# Patient Record
Sex: FEMALE | Race: WHITE | NOT HISPANIC OR LATINO | Employment: UNEMPLOYED | URBAN - METROPOLITAN AREA
[De-identification: names, ages, dates, MRNs, and addresses within clinical notes are randomized per-mention and may not be internally consistent; named-entity substitution may affect disease eponyms.]

---

## 2022-11-30 ENCOUNTER — TRANSFERRED RECORDS (OUTPATIENT)
Dept: HEALTH INFORMATION MANAGEMENT | Facility: CLINIC | Age: 37
End: 2022-11-30

## 2023-02-13 ENCOUNTER — TRANSFERRED RECORDS (OUTPATIENT)
Dept: HEALTH INFORMATION MANAGEMENT | Facility: CLINIC | Age: 38
End: 2023-02-13

## 2023-02-18 ENCOUNTER — TRANSFERRED RECORDS (OUTPATIENT)
Dept: HEALTH INFORMATION MANAGEMENT | Facility: CLINIC | Age: 38
End: 2023-02-18

## 2023-02-18 LAB
ALT SERPL-CCNC: 28 U/L (ref 9–41)
AST SERPL-CCNC: 17 U/L (ref 12–38)
CREATININE (EXTERNAL): 0.8 MG/DL (ref 0.7–1.4)
GFR ESTIMATED (EXTERNAL): >60 ML/MIN/1.73M2
GLUCOSE (EXTERNAL): 442 MG/DL (ref 64–112)
POTASSIUM (EXTERNAL): 4.2 MMOL/L (ref 3.5–5.3)

## 2023-03-07 ENCOUNTER — MEDICAL CORRESPONDENCE (OUTPATIENT)
Dept: HEALTH INFORMATION MANAGEMENT | Facility: HOSPITAL | Age: 38
End: 2023-03-07

## 2023-03-17 ENCOUNTER — TRANSFERRED RECORDS (OUTPATIENT)
Dept: HEALTH INFORMATION MANAGEMENT | Facility: CLINIC | Age: 38
End: 2023-03-17

## 2023-03-17 LAB
ALT SERPL-CCNC: 38 U/L (ref 9–41)
AST SERPL-CCNC: 25 U/L (ref 12–38)
CREATININE (EXTERNAL): 0.9 MG/DL (ref 0.7–1.4)
GFR ESTIMATED (EXTERNAL): >60 ML/MIN/1.73M2
GLUCOSE (EXTERNAL): 560 MG/DL (ref 64–112)
HBA1C MFR BLD: 11.2 % (ref 4.7–6.7)
POTASSIUM (EXTERNAL): 4.1 MMOL/L (ref 3.5–5.3)

## 2023-03-29 ENCOUNTER — OFFICE VISIT (OUTPATIENT)
Dept: CARDIOLOGY | Facility: OTHER | Age: 38
End: 2023-03-29
Attending: NURSE PRACTITIONER
Payer: COMMERCIAL

## 2023-03-29 VITALS
DIASTOLIC BLOOD PRESSURE: 85 MMHG | OXYGEN SATURATION: 98 % | HEART RATE: 104 BPM | RESPIRATION RATE: 20 BRPM | WEIGHT: 255.8 LBS | HEIGHT: 67 IN | BODY MASS INDEX: 40.15 KG/M2 | SYSTOLIC BLOOD PRESSURE: 160 MMHG

## 2023-03-29 DIAGNOSIS — I10 ESSENTIAL HYPERTENSION: ICD-10-CM

## 2023-03-29 DIAGNOSIS — E78.5 HYPERLIPIDEMIA LDL GOAL <100: ICD-10-CM

## 2023-03-29 DIAGNOSIS — E11.65 TYPE 2 DIABETES MELLITUS WITH HYPERGLYCEMIA, WITH LONG-TERM CURRENT USE OF INSULIN (H): ICD-10-CM

## 2023-03-29 DIAGNOSIS — Z79.4 TYPE 2 DIABETES MELLITUS WITH HYPERGLYCEMIA, WITH LONG-TERM CURRENT USE OF INSULIN (H): ICD-10-CM

## 2023-03-29 DIAGNOSIS — R60.0 BILATERAL LOWER EXTREMITY EDEMA: ICD-10-CM

## 2023-03-29 DIAGNOSIS — R07.9 EXERTIONAL CHEST PAIN: ICD-10-CM

## 2023-03-29 DIAGNOSIS — I47.10 SVT (SUPRAVENTRICULAR TACHYCARDIA) (H): Primary | ICD-10-CM

## 2023-03-29 DIAGNOSIS — F17.200 TOBACCO USE DISORDER: ICD-10-CM

## 2023-03-29 DIAGNOSIS — R06.09 DYSPNEA ON EXERTION: ICD-10-CM

## 2023-03-29 DIAGNOSIS — E66.01 CLASS 3 SEVERE OBESITY DUE TO EXCESS CALORIES WITHOUT SERIOUS COMORBIDITY WITH BODY MASS INDEX (BMI) OF 40.0 TO 44.9 IN ADULT (H): ICD-10-CM

## 2023-03-29 DIAGNOSIS — Z82.49 FAMILY HISTORY OF EARLY CAD: ICD-10-CM

## 2023-03-29 DIAGNOSIS — E66.813 CLASS 3 SEVERE OBESITY DUE TO EXCESS CALORIES WITHOUT SERIOUS COMORBIDITY WITH BODY MASS INDEX (BMI) OF 40.0 TO 44.9 IN ADULT (H): ICD-10-CM

## 2023-03-29 DIAGNOSIS — R00.0 SINUS TACHYCARDIA: ICD-10-CM

## 2023-03-29 PROBLEM — D72.829 LEUKOCYTOSIS: Status: ACTIVE | Noted: 2021-02-25

## 2023-03-29 PROBLEM — K21.9 GASTROESOPHAGEAL REFLUX DISEASE WITHOUT ESOPHAGITIS: Status: ACTIVE | Noted: 2021-02-25

## 2023-03-29 PROBLEM — K80.20 CALCULUS OF GALLBLADDER WITHOUT CHOLECYSTITIS WITHOUT OBSTRUCTION: Status: ACTIVE | Noted: 2022-06-29

## 2023-03-29 PROBLEM — E11.9 DIABETES TYPE 2, CONTROLLED (H): Status: ACTIVE | Noted: 2021-09-15

## 2023-03-29 PROBLEM — F31.9 BIPOLAR 1 DISORDER (H): Status: ACTIVE | Noted: 2021-02-25

## 2023-03-29 PROBLEM — F41.0 PANIC ANXIETY SYNDROME: Status: ACTIVE | Noted: 2021-01-04

## 2023-03-29 PROCEDURE — 93010 ELECTROCARDIOGRAM REPORT: CPT | Mod: 77 | Performed by: INTERNAL MEDICINE

## 2023-03-29 PROCEDURE — 99205 OFFICE O/P NEW HI 60 MIN: CPT | Performed by: NURSE PRACTITIONER

## 2023-03-29 PROCEDURE — G0463 HOSPITAL OUTPT CLINIC VISIT: HCPCS

## 2023-03-29 PROCEDURE — 93005 ELECTROCARDIOGRAM TRACING: CPT | Performed by: NURSE PRACTITIONER

## 2023-03-29 RX ORDER — CLONAZEPAM 0.5 MG/1
TABLET ORAL
COMMUNITY
Start: 2023-03-13 | End: 2023-05-18

## 2023-03-29 RX ORDER — EXENATIDE 2 MG/.85ML
INJECTION, SUSPENSION, EXTENDED RELEASE SUBCUTANEOUS
COMMUNITY
Start: 2023-03-23 | End: 2023-05-18

## 2023-03-29 RX ORDER — METFORMIN HCL 500 MG
TABLET, EXTENDED RELEASE 24 HR ORAL
COMMUNITY
Start: 2022-11-17

## 2023-03-29 RX ORDER — OLANZAPINE 10 MG/1
10 TABLET ORAL AT BEDTIME
COMMUNITY
Start: 2023-03-27 | End: 2023-05-18

## 2023-03-29 RX ORDER — ATORVASTATIN CALCIUM 20 MG/1
20 TABLET, FILM COATED ORAL AT BEDTIME
COMMUNITY
Start: 2023-03-07

## 2023-03-29 RX ORDER — SUMATRIPTAN 50 MG/1
TABLET, FILM COATED ORAL
COMMUNITY
Start: 2022-07-19 | End: 2023-09-26

## 2023-03-29 RX ORDER — ASPIRIN 81 MG/1
TABLET, CHEWABLE ORAL
COMMUNITY
Start: 2023-03-07 | End: 2023-05-18

## 2023-03-29 RX ORDER — BUSPIRONE HYDROCHLORIDE 5 MG/1
TABLET ORAL
COMMUNITY
Start: 2023-01-18 | End: 2023-03-29

## 2023-03-29 RX ORDER — METOPROLOL SUCCINATE 50 MG/1
50 TABLET, EXTENDED RELEASE ORAL EVERY EVENING
Qty: 90 TABLET | Refills: 1 | Status: SHIPPED | OUTPATIENT
Start: 2023-03-29

## 2023-03-29 RX ORDER — MEDROXYPROGESTERONE ACETATE 150 MG/ML
150 INJECTION, SUSPENSION INTRAMUSCULAR
COMMUNITY
Start: 2021-08-10 | End: 2023-09-26

## 2023-03-29 RX ORDER — FUROSEMIDE 20 MG
20 TABLET ORAL DAILY
Qty: 90 TABLET | Refills: 1 | Status: SHIPPED | OUTPATIENT
Start: 2023-03-29 | End: 2023-09-26

## 2023-03-29 RX ORDER — ALBUTEROL SULFATE 90 UG/1
AEROSOL, METERED RESPIRATORY (INHALATION)
COMMUNITY
Start: 2022-11-17

## 2023-03-29 RX ORDER — INSULIN GLARGINE 100 [IU]/ML
INJECTION, SOLUTION SUBCUTANEOUS
COMMUNITY
Start: 2023-03-23

## 2023-03-29 RX ORDER — LURASIDONE HYDROCHLORIDE 40 MG/1
TABLET, FILM COATED ORAL
COMMUNITY
Start: 2023-01-23 | End: 2023-03-29

## 2023-03-29 RX ORDER — LISINOPRIL 5 MG/1
1 TABLET ORAL
COMMUNITY
Start: 2023-03-07 | End: 2023-03-29

## 2023-03-29 RX ORDER — LISINOPRIL 10 MG/1
10 TABLET ORAL
Qty: 90 TABLET | Refills: 1 | Status: SHIPPED | OUTPATIENT
Start: 2023-03-29

## 2023-03-29 RX ORDER — NYSTATIN 100000 [USP'U]/G
POWDER TOPICAL
COMMUNITY
Start: 2022-11-22 | End: 2023-09-26

## 2023-03-29 ASSESSMENT — PAIN SCALES - GENERAL: PAINLEVEL: MODERATE PAIN (5)

## 2023-03-29 NOTE — PROGRESS NOTES
"[facility] HEART CARE   CARDIOLOGY CONSULT     Esther Morales   [address]    [facility] [address]      No primary care provider on file.     Chief Complaint   Patient presents with     Consult     SVT        HPI:   Esther Morales is a pleasant 37-year old female who presents for cardiology consult regarding abnormal leadless EKG monitor. She has a cardiac history including irregular heart beat, SVT, sinus tachycardia, hypertension, hyperlipidemia. She has a non-cardiac history including type II diabetes mellitus, obesity, tobacco use.       Today, Ms. Morales tells me she has a longstanding history of irregular heart beat. She describes this as a pounding sensation and sometimes does make her chest hurt. She tells me that she feels this sensation all day long. When she lies down symptoms improve. She tells me when outside or in the heat she feels near-syncopal. She has had a syncopal episode a few years ago- she tells me she was just standing in a room where it was hot outside and she passed out onto a bed. She was not evaluated as she attributed this to the heat. She also tells me that she feels dizzy and has episodes about twice daily where head starts spinning, vision changes- goes blank. This lasts maybe a minute or two at most. Symptoms usually start after she has been standing for a while. She sits down and rests and symptoms improve.    She is limited to going up or down stairs due to arthritis in her knees. She tells me she could probably walk about 1/2 block and then would need to stop and rest due to chest pain and breathing. Chest discomfort feels like \"a pounding, sometimes a sharp pain.\" Symptoms do improve with rest. She also tells me she has had two week history of bilateral lower extremity edema. Sitting with feet in a dependent position is uncomfortable due to swelling. She does try to keep them elevated but this does not seem to make much difference in swelling or discomfort.         Smoking History: " She smokes medical cannabis for anxiety/depression. Started smoking cigarettes at age 17, continues to smoke daily- 1/2 ppd. At her peak she smokes up to 1 ppd.      Alcohol History: None      Substance Abuse History: None      Sleep History: She does snore. She does have a history of witnessed apnea from  who will rouse her awake. Wakes up zero out of 7 mornings feeling rested. She does feel heart pounding at night and it will wake her up. She does have daytime fatigue. She does take midday nap around 12/1pm. She is scheduled to have a sleep evaluation at [facility] May 15.       Family History: Father- MI s/p stents (first was at age 52), passed away this year at age 65 from MI, she tells me her father also had an ablation on his heart but uncertain exactly what this ablation was for. Mother- 11 open heart surgeries (first MI at age 35 years old), diabetes s/p double amputation, She does have 9 siblings with same father- no cardiac disease  She does have 4 siblings with same mother- oldest brother had MI two years ago at age 37 with CABG.           RELEVANT TESTING:  Leadless EKG Monitor 2/2023  Predominant underlying sinus rhythm  Minimum heart rate 46 bpm, maximum  bpm, average heart rate of 106 bpm    x1 run of SVT lasting 5 beats with a max rate of 174 bpm  X 12 triggered events correlated with sinus rhythm HR . Activity not reported with triggered events      PAST MEDICAL HISTORY:   History reviewed. No pertinent past medical history.       FAMILY HISTORY:   History reviewed. No pertinent family history.       PAST SURGICAL HISTORY:   History reviewed. No pertinent surgical history.       SOCIAL HISTORY:           CURRENT MEDICATIONS:   Current Outpatient Medications   Medication     atorvastatin (LIPITOR) 20 MG tablet     BYDUREON BCISE 2 MG/0.85ML auto-injector     clonazePAM (KLONOPIN) 0.5 MG tablet     furosemide (LASIX) 20 MG tablet     GOODSENSE ASPIRIN 81 MG chewable  "tablet     LANTUS SOLOSTAR 100 UNIT/ML soln     lisinopril (ZESTRIL) 10 MG tablet     medroxyPROGESTERone (DEPO-PROVERA) 150 MG/ML IM injection     metFORMIN (GLUCOPHAGE XR) 500 MG 24 hr tablet     metoprolol succinate ER (TOPROL XL) 50 MG 24 hr tablet     nystatin (MYCOSTATIN) 416287 UNIT/GM external powder     OLANZapine (ZYPREXA) 10 MG tablet     omeprazole (PRILOSEC) 20 MG DR capsule     SUMAtriptan (IMITREX) 50 MG tablet     VENTOLIN  (90 Base) MCG/ACT inhaler     No current facility-administered medications for this visit.            ALLERGIES:   Allergies   Allergen Reactions     Cyclobenzaprine Dizziness     Panic, dizziness          ROS:   CONSTITUTIONAL: No reported fever or chills. No changes in weight.  ENT: No visual disturbance, ear ache, epistaxis or sore throat.   CARDIOVASCULAR: +Chest discomfort, palpitations/racing, bilateral lower extremities (See HPI).   RESPIRATORY: +dyspnea, dyspnea on exertion (See HPI). No cough, wheezing or hemoptysis.   GI: No abdominal pain. No nausea, vomiting or diarrhea. No melena or hematochezia. No changes in bowel habits.   : No hematuria or dysuria. No hesitancy or incontinence.   NEUROLOGICAL: +lightheadedness, dizziness, near-syncope (See HPI).  No headache, ataxia, paresthesias or weakness.   HEMATOLOGIC: No bleeding or excessive bruising. No history of blood clots.   MUSCULOSKELETAL: No joint pain or swelling, no muscle pain.  ENDOCRINOLOGIC: No temperature intolerance. No hair or skin changes.  SKIN: No abnormal rashes or sores, no unusual itching.  PSYCHIATRIC:  No changes in mood, feeling down or anxious. No changes in sleep.      PHYSICAL EXAM:     Vitals: BP (!) 160/85 (BP Location: Left arm, Patient Position: Sitting, Cuff Size: Adult Regular)   Pulse 104   Resp 20   Ht 1.702 m (5' 7\")   Wt 116 kg (255 lb 12.8 oz)   SpO2 98%   BMI 40.06 kg/m    BMI= Body mass index is 40.06 kg/m .    GENERAL: The patient is a well-developed, well-nourished, " in no apparent distress.  HEENT: Head is normocephalic and atraumatic. Eyes are symmetrical with normal visual tracking. No icterus, no xanthelasmas. Nares appeared normal without nasal drainage. Mucous membranes are moist, no cyanosis.  NECK: Supple. No adenopathy, asymmetry or masses. Carotid upstroke are normal bilaterally, no cervical bruits, JVP not visible.   CHEST/ LUNGS: Lungs clear to auscultation, no rales, rhonchi or wheezes, no use of accessory muscles, no retractions, respirations unlabored and normal respiratory rate.   CARDIO: Regular rhythm, tachycardic rate S1 and S2, no S3 or S4 and no murmur, click or rub. Precordium quiet with normal PMI.    ABD: Abdomen is soft and nontender, nondistended.   EXTREMITIES: bilateral +1 lower extremity edema. Peripheral pulses normal and equal bilaterally.  VASC: Radial,  dorsalis pedis and posterior tibialis pulses normal and symmetric with no vascular bruits heard.  MUSCULOSKELETAL: No joint swelling.   NEUROLOGIC: Alert and oriented X3. Normal speech, gait and affect. No focal neurologic deficits.   SKIN: No jaundice. No rashes or visible skin lesions present. No ecchymosis.            EKG Interpretation:      Rhythm: Normal sinus   Rate: Normal  Axis: Normal  Ectopy: None  Conduction: normal LYNETTE 136 ms, normal QRS duration 90 ms, normal  ms, normal QTc 454 ms  ST Segments/ T Waves: No acute ischemic changes  Q Waves: None  Comparison to prior: No old EKG available      LAB RESULTS:   Orders placed or performed in visit on 03/29/23     BYDUREON BCISE 2 MG/0.85ML auto-injector     LANTUS SOLOSTAR 100 UNIT/ML soln     clonazePAM (KLONOPIN) 0.5 MG tablet     atorvastatin (LIPITOR) 20 MG tablet     busPIRone (BUSPAR) 5 MG tablet *Discontinued*     GOODSENSE ASPIRIN 81 MG chewable tablet     lisinopril (ZESTRIL) 5 MG tablet *Discontinued*     LATUDA 40 MG TABS tablet *Discontinued*     VENTOLIN  (90 Base) MCG/ACT inhaler     medroxyPROGESTERone  (DEPO-PROVERA) 150 MG/ML IM injection     metFORMIN (GLUCOPHAGE XR) 500 MG 24 hr tablet     nystatin (MYCOSTATIN) 152631 UNIT/GM external powder     OLANZapine (ZYPREXA) 10 MG tablet     omeprazole (PRILOSEC) 20 MG DR capsule     SUMAtriptan (IMITREX) 50 MG tablet     metoprolol succinate ER (TOPROL XL) 50 MG 24 hr tablet     lisinopril (ZESTRIL) 10 MG tablet     furosemide (LASIX) 20 MG tablet          ASSESSMENT:   Esther Morales is a pleasant 37-year old female who presents for cardiology consult regarding abnormal leadless EKG monitor. She has a cardiac history including irregular heart beat, SVT, sinus tachycardia, hypertension, hyperlipidemia. She has a non-cardiac history including type II diabetes mellitus, obesity, tobacco use. Today, Ms. Morales is symptomatic with sinus tachycardia while instructed to stand for 10 minutes. She was able to tolerate standing for about 5 minutes. She has had exertional chest discomfort, dyspnea on exertion and cardiovascular risk factors. She has also had ongoing lower extremity edema which has worsened in the last month or so.         PLAN:   1. Sinus tachycardia  2. Supraventricular tachycardia    We attempted to stand for 10 minutes; however, at 5 minutes into standing she started to feel lightheaded, dizzy and needing to sit. She was shaky and diaphoretic. Heart rate prior to sitting after 5 minutes of standing was 132 bpm. Heart rate while sitting prior to standing was 104 bpm. POTs is possibility     Reviewed zio patch showing average heart rate of 106 bpm. Multiple episodes of sinus tachycardia- some may be supraventricular tachycardia.     Discussed options for management- she thinks she stays hydrated with five 20 ounce bottles of water daily, she does not follow any sodium restrictions, she does not use compression to lower extremities but this should be considered    History of diabetes mellitus type II as well as hypertension. We will trial beta-blocker to see if  there is some improvement in symptoms. Start metoprolol succinate 50 mg once daily.       3. Exertional chest discomfort  4. Dyspnea on exertion  5. Family history early ischemic heart disease    Risk factors including: poorly controlled type II DM, hyperlipidemia, hypertension, obesity, tobacco use, family history    Plan for NM Lexiscan stress test with symptoms of dyspnea on exertion, exertional chest pain, decreased activity tolerance      6. Lower extremity edema    Plan for transthoracic echocardiogram    Elevation, compression with compression stockings or ACE wraps can be helpful, monitor sodium intake    Start furosemide 20 mg once daily      7. Hypertension    Increase lisinopril 5 mg once daily to lisinopril 10 mg once daily    We are starting furosemide 20 mg once daily for LE edema which should also improve blood pressure  BP Readings from Last 6 Encounters:   03/29/23 (!) 160/85       8. Hyperlipidemia with LDL goal less than 100    Continue atorvastatin 20 mg once daily      9. Type II Diabetes Mellitus    Uncontrolled with recent A1c of 11+%    Statin: Continue atorvastatin 20 mg once daily    Continue management by PCP    Metformin 1,000 mg twice daily    Insulin      10. Obesity    Lifestyle modifications    Could consider medical management with primary care provider      11. Tobacco use    Smoking cessation strongly encouraged for cardiovascular health as well as overall health      Follow-up with cardiology after completion of testing, certainly sooner with acute concerns.      Thank you for allowing me to participate in the care of your patient. Please do not hesitate to contact me if you have any questions.     Theodora Bird CNP       Total time spent on day of visit, including review of tests, obtaining/reviewing separately obtained history, ordering medications/tests/procedures, communicating with PCP/consultants, and documenting in electronic medical record: 70 minutes.

## 2023-04-12 ENCOUNTER — TRANSFERRED RECORDS (OUTPATIENT)
Dept: HEALTH INFORMATION MANAGEMENT | Facility: CLINIC | Age: 38
End: 2023-04-12

## 2023-04-21 ENCOUNTER — TRANSFERRED RECORDS (OUTPATIENT)
Dept: HEALTH INFORMATION MANAGEMENT | Facility: CLINIC | Age: 38
End: 2023-04-21

## 2023-04-21 LAB
ALBUMIN (EXTERNAL): 3 G/DL (ref 3.8–5.2)
ALKALINE PHOSPHATASE (EXTERNAL): 151 U/L (ref 30–103)
ALT SERPL-CCNC: 33 U/L (ref 9–41)
ANION GAP SERPL CALC-SCNC: 17 MMOL/L (ref 5–15)
AST SERPL-CCNC: 19 U/L (ref 12–38)
BILIRUB SERPL-MCNC: 0.2 MG/DL (ref 0.3–1)
BUN SERPL-MCNC: 11 MG/DL (ref 7–19)
CALCIUM (EXTERNAL): 9.7 MG/DL (ref 8.5–10.5)
CHLORIDE (EXTERNAL): 101 MMOL/L (ref 99–111)
CO2 (EXTERNAL): 20 MMOL/L (ref 22–34)
CREATININE (EXTERNAL): 0.8 MG/DL (ref 0.7–1.4)
GFR ESTIMATED (EXTERNAL): >60 ML/MIN/1.73M2
GFR ESTIMATED (IF AFRICAN AMERICAN) (EXTERNAL): ABNORMAL ML/MIN/1.73M2
GLUCOSE (EXTERNAL): 306 MG/DL (ref 64–112)
POTASSIUM (EXTERNAL): 4 MMOL/L (ref 3.5–5.3)
PROTEIN TOTAL (EXTERNAL): 7.2 G/DL (ref 6–8.2)
SODIUM (EXTERNAL): 134 MMOL/L (ref 135–150)

## 2023-04-26 ENCOUNTER — HOSPITAL ENCOUNTER (OUTPATIENT)
Dept: CARDIOLOGY | Facility: HOSPITAL | Age: 38
Discharge: HOME | End: 2023-04-26
Attending: NURSE PRACTITIONER | Admitting: INTERNAL MEDICINE
Payer: COMMERCIAL

## 2023-04-26 DIAGNOSIS — E78.5 HYPERLIPIDEMIA LDL GOAL <100: ICD-10-CM

## 2023-04-26 DIAGNOSIS — R07.9 EXERTIONAL CHEST PAIN: ICD-10-CM

## 2023-04-26 DIAGNOSIS — E11.65 TYPE 2 DIABETES MELLITUS WITH HYPERGLYCEMIA, WITH LONG-TERM CURRENT USE OF INSULIN (H): ICD-10-CM

## 2023-04-26 DIAGNOSIS — E66.01 CLASS 3 SEVERE OBESITY DUE TO EXCESS CALORIES WITHOUT SERIOUS COMORBIDITY WITH BODY MASS INDEX (BMI) OF 40.0 TO 44.9 IN ADULT (H): ICD-10-CM

## 2023-04-26 DIAGNOSIS — E66.813 CLASS 3 SEVERE OBESITY DUE TO EXCESS CALORIES WITHOUT SERIOUS COMORBIDITY WITH BODY MASS INDEX (BMI) OF 40.0 TO 44.9 IN ADULT (H): ICD-10-CM

## 2023-04-26 DIAGNOSIS — R60.0 BILATERAL LOWER EXTREMITY EDEMA: ICD-10-CM

## 2023-04-26 DIAGNOSIS — R00.0 SINUS TACHYCARDIA: ICD-10-CM

## 2023-04-26 DIAGNOSIS — I47.10 SVT (SUPRAVENTRICULAR TACHYCARDIA) (H): ICD-10-CM

## 2023-04-26 DIAGNOSIS — R06.09 DYSPNEA ON EXERTION: ICD-10-CM

## 2023-04-26 DIAGNOSIS — I10 ESSENTIAL HYPERTENSION: ICD-10-CM

## 2023-04-26 DIAGNOSIS — Z79.4 TYPE 2 DIABETES MELLITUS WITH HYPERGLYCEMIA, WITH LONG-TERM CURRENT USE OF INSULIN (H): ICD-10-CM

## 2023-04-26 DIAGNOSIS — Z82.49 FAMILY HISTORY OF EARLY CAD: ICD-10-CM

## 2023-04-26 LAB — LVEF ECHO: NORMAL

## 2023-04-26 PROCEDURE — 93306 TTE W/DOPPLER COMPLETE: CPT

## 2023-05-15 ENCOUNTER — TRANSFERRED RECORDS (OUTPATIENT)
Dept: HEALTH INFORMATION MANAGEMENT | Facility: CLINIC | Age: 38
End: 2023-05-15

## 2023-05-18 DIAGNOSIS — E11.9 DIABETES TYPE 2, CONTROLLED (H): Primary | ICD-10-CM

## 2023-05-18 RX ORDER — PROCHLORPERAZINE 25 MG/1
SUPPOSITORY RECTAL
COMMUNITY
Start: 2023-05-09

## 2023-05-18 RX ORDER — OLANZAPINE 7.5 MG/1
7.5 TABLET, FILM COATED ORAL
COMMUNITY
Start: 2023-04-28

## 2023-05-18 RX ORDER — ASPIRIN 81 MG/1
81 TABLET ORAL DAILY
COMMUNITY

## 2023-05-18 RX ORDER — LOPERAMIDE HCL 2 MG
CAPSULE ORAL
COMMUNITY
Start: 2023-05-03

## 2023-05-18 RX ORDER — CLONAZEPAM 1 MG/1
1 TABLET ORAL
COMMUNITY
Start: 2023-04-27

## 2023-05-30 ENCOUNTER — TELEPHONE (OUTPATIENT)
Dept: CARDIOLOGY | Facility: OTHER | Age: 38
End: 2023-05-30

## 2023-05-30 NOTE — TELEPHONE ENCOUNTER
This patient did not show for the Nuclear Medicine Stress Test scheduled for today, 5-30-23 at 0730.   We will have our scheduling department reach out to the patient to reschedule.

## 2023-06-20 ENCOUNTER — TELEPHONE (OUTPATIENT)
Dept: FAMILY MEDICINE | Facility: OTHER | Age: 38
End: 2023-06-20

## 2023-06-20 NOTE — TELEPHONE ENCOUNTER
[phone] Patient    Patient would like Theodora Bird to call her back regarding her medication and want's to speak to her regarding her last appointment on 3/29/23

## 2023-06-20 NOTE — TELEPHONE ENCOUNTER
Per patient she is working on quitting her Klonopin (states this is what's causing her heart rate increase) with her therapist. States she wanted to let Theodora know.   Patient did cancel her Stress test a few times and her follow up. Stress test is scheduled for 06/28/23 patient agrees to complete it this day. Follow up made for 06/29.

## 2023-06-21 NOTE — TELEPHONE ENCOUNTER
Theodora Bird CNP sent to [facility]  Caller: Unspecified (Yesterday, 10:28 AM)  If I remember right she drives from [place] to [place]. Could we check and see if she would like to be seen on the 28th in the 1230 spot and see me after stress test this might be more convenient. She could just check in whenever she is done with the stress test. If not, she can keep appointment for the 29th       Thanks,     Theodora Bird CNP on 6/21/2023 at 6:30 AM     Spoke with patient appt changed.

## 2023-06-27 ENCOUNTER — TELEPHONE (OUTPATIENT)
Dept: NUCLEAR MEDICINE | Facility: HOSPITAL | Age: 38
End: 2023-06-27

## 2023-06-27 NOTE — TELEPHONE ENCOUNTER
Talked to patient regarding the NM Lexiscan stress test scheduled for 6/28 at 630.  No caffeine 12 hours before and no food 4 hours before the scan. Ok to take meds with a small amount of water.

## 2023-06-28 ENCOUNTER — TELEPHONE (OUTPATIENT)
Dept: CARDIOLOGY | Facility: OTHER | Age: 38
End: 2023-06-28

## 2023-06-28 ENCOUNTER — TELEPHONE (OUTPATIENT)
Dept: NURSING | Facility: CLINIC | Age: 38
End: 2023-06-28
Payer: COMMERCIAL

## 2023-06-28 NOTE — TELEPHONE ENCOUNTER
10:10 AM    Reason for Call: Phone Call    Description:     Patient stress test got rescheduled to Thursday 6/29/23 at 6:30am she is wondering if Theodora Bird still wants to see her today 6/28/23 12:30pm or does Theodora Bird want to see her on Thursday after her stress test?     Was an appointment offered for this call? No  If yes : Appointment type              Date    Preferred method for responding to this message: Telephone Call  What is your phone number ? [phone]    If we cannot reach you directly, may we leave a detailed response at the number you provided? Yes    Can this message wait until your PCP/provider returns, if available today? YES, No

## 2023-06-28 NOTE — TELEPHONE ENCOUNTER
Spoke with the patient. She is wondering if she could see Jaz tomorrow. She states that she has a lot of appointments coming up and its really hard for her to schedule anything right now. I did let the patient patient know that jaz is completely booked for tomorrow but she still wanted me to send a message back to see if she would be willing to fit her in somewhere.   Please advise. Thank you.

## 2023-06-28 NOTE — TELEPHONE ENCOUNTER
Good Morning,     This patient did not show for the Nuclear Medicine Lexiscan Stress Test appointments scheduled for today, 6-28-23, at 0630.    A reminder call was performed yesterday and we did talk to the patient.    This is now this patient's second no show for this exam.    We typically do a 3 strikes your out.  We will attempt to reschedule this patient 1 more time.    Thank you,  Susana

## 2023-06-28 NOTE — TELEPHONE ENCOUNTER
I received this at 12:55 which is after the patient's appointment time. I see she did not show up for her appt today, Theodora's next available is 7/14. Is it okay to schedule her for next available or is Theodora wanting to work her in sooner then that?   Please advise. Thank you.

## 2023-06-28 NOTE — TELEPHONE ENCOUNTER
Patient calling requesting to know if she can smoke a cigarette prior to her stress test. Advised to abstain from smoking and caffeine with patient expressing understanding.     Mikala Wallis RN 06/28/23 3:23 AM    Health Triage Nurse Advisor

## 2023-06-29 ENCOUNTER — TELEPHONE (OUTPATIENT)
Dept: CARDIOLOGY | Facility: OTHER | Age: 38
End: 2023-06-29

## 2023-06-29 NOTE — TELEPHONE ENCOUNTER
Good Morning,    This patient no showed again for the Nuclear Medicine Stress Test.  This is now her 3rd no show.   We have a 3 strikes your out rule due to several wasted isotope doses (6) and several appointment time slots.    When this happens we request the provider have an in depth conversation with the patient.   Generally after three no shows the order gets cancelled and the provider sees the patient in office and it is decided if a new order is entered to attempt one last time.    I will instruct our DI scheduling team to not re-contact this patient.     Please let us know how you would like to move forward.    No Show  5-30-23 6-28-23 6-29-23    Thank you,  Susana

## 2023-06-29 NOTE — TELEPHONE ENCOUNTER
Outreach to patient. No answer. Left voicemail that she will need to call back and schedule and appt with Theodora Bird CNP to discuss imaging as she has no showed 3 stress tests.

## 2023-07-18 ENCOUNTER — TRANSFERRED RECORDS (OUTPATIENT)
Dept: OTHER | Facility: HOSPITAL | Age: 38
End: 2023-07-18

## 2023-09-26 ENCOUNTER — OFFICE VISIT (OUTPATIENT)
Dept: CARDIOLOGY | Facility: OTHER | Age: 38
End: 2023-09-26
Attending: NURSE PRACTITIONER
Payer: COMMERCIAL

## 2023-09-26 VITALS
WEIGHT: 293 LBS | BODY MASS INDEX: 45.99 KG/M2 | HEART RATE: 102 BPM | OXYGEN SATURATION: 98 % | HEIGHT: 67 IN | DIASTOLIC BLOOD PRESSURE: 84 MMHG | SYSTOLIC BLOOD PRESSURE: 120 MMHG | TEMPERATURE: 97.1 F

## 2023-09-26 DIAGNOSIS — E11.65 TYPE 2 DIABETES MELLITUS WITH HYPERGLYCEMIA, WITH LONG-TERM CURRENT USE OF INSULIN (H): ICD-10-CM

## 2023-09-26 DIAGNOSIS — Z82.49 FAMILY HISTORY OF EARLY CAD: ICD-10-CM

## 2023-09-26 DIAGNOSIS — R07.9 EXERTIONAL CHEST PAIN: ICD-10-CM

## 2023-09-26 DIAGNOSIS — R60.0 BILATERAL LOWER EXTREMITY EDEMA: ICD-10-CM

## 2023-09-26 DIAGNOSIS — R00.0 SINUS TACHYCARDIA: ICD-10-CM

## 2023-09-26 DIAGNOSIS — I47.10 SVT (SUPRAVENTRICULAR TACHYCARDIA) (H): Primary | ICD-10-CM

## 2023-09-26 DIAGNOSIS — Z79.4 TYPE 2 DIABETES MELLITUS WITH HYPERGLYCEMIA, WITH LONG-TERM CURRENT USE OF INSULIN (H): ICD-10-CM

## 2023-09-26 DIAGNOSIS — I10 ESSENTIAL HYPERTENSION: ICD-10-CM

## 2023-09-26 DIAGNOSIS — F17.200 TOBACCO USE DISORDER: ICD-10-CM

## 2023-09-26 DIAGNOSIS — E78.5 HYPERLIPIDEMIA LDL GOAL <100: ICD-10-CM

## 2023-09-26 DIAGNOSIS — R06.09 DYSPNEA ON EXERTION: ICD-10-CM

## 2023-09-26 PROCEDURE — G0463 HOSPITAL OUTPT CLINIC VISIT: HCPCS | Performed by: NURSE PRACTITIONER

## 2023-09-26 PROCEDURE — 99215 OFFICE O/P EST HI 40 MIN: CPT | Performed by: NURSE PRACTITIONER

## 2023-09-26 RX ORDER — BLOOD-GLUCOSE METER
1 EACH MISCELLANEOUS PRN
COMMUNITY
Start: 2023-03-30

## 2023-09-26 RX ORDER — METHOCARBAMOL 500 MG/1
500 TABLET, FILM COATED ORAL AT BEDTIME
COMMUNITY
Start: 2023-08-29

## 2023-09-26 RX ORDER — FLURBIPROFEN SODIUM 0.3 MG/ML
SOLUTION/ DROPS OPHTHALMIC PRN
COMMUNITY
Start: 2023-07-18

## 2023-09-26 RX ORDER — INSULIN ASPART 100 [IU]/ML
1 INJECTION, SOLUTION INTRAVENOUS; SUBCUTANEOUS PRN
COMMUNITY
Start: 2023-07-18

## 2023-09-26 RX ORDER — LANCETS
1 EACH MISCELLANEOUS PRN
COMMUNITY
Start: 2023-04-17

## 2023-09-26 RX ORDER — PANTOPRAZOLE SODIUM 40 MG/1
1 TABLET, DELAYED RELEASE ORAL
COMMUNITY
Start: 2023-07-28

## 2023-09-26 ASSESSMENT — PAIN SCALES - GENERAL: PAINLEVEL: EXTREME PAIN (8)

## 2023-09-26 NOTE — PATIENT INSTRUCTIONS
Thank you for allowing Theodora Bird CNP and our  team to participate in your care. Please call our office at [phone] with scheduling questions or if you need to cancel or change your appointment. With any other questions or concerns you may call cardiology nurse at [phone] or [phone].       If you experience chest pain, chest pressure, chest tightness, shortness of breath, fainting, lightheadedness, nausea, vomiting, or other concerning symptoms, please report to the Emergency Department or call 911. These symptoms may be emergent, and best treated in the Emergency Department.      Stop furosemide since this has not changed LE edema, improved breathing. If there are increased symptoms with stopping, call and update us.  Continue metoprolol succinate 50 mg once daily for now. If symptoms improve with decreasing klonopin to once daily in the evenings we could consider stopping this as well in the future.   Plan for NM lexiscan stress test given cardiovascular risk factors and symptoms.       Follow-up after stress testing and seeing mental health provider    Theodora Bird CNP

## 2023-09-26 NOTE — PROGRESS NOTES
[facility] HEART CARE   CARDIOLOGY PROGRESS NOTE     Chief Complaint   Patient presents with    Cardiology Problem     Follow up           Diagnosis:    ICD-10-CM    1. SVT (supraventricular tachycardia) (H)  I47.1       2. Sinus tachycardia  R00.0       3. Exertional chest pain  R07.9 NM Lexiscan stress test      4. Dyspnea on exertion  R06.09 NM Lexiscan stress test      5. Family history of early CAD  Z82.49 NM Lexiscan stress test      6. Bilateral lower extremity edema  R60.0       7. Essential hypertension  I10 NM Lexiscan stress test      8. Hyperlipidemia LDL goal <100  E78.5 NM Lexiscan stress test      9. Type 2 diabetes mellitus with hyperglycemia, with long-term current use of insulin (H)  E11.65 NM Lexiscan stress test    Z79.4       10. Tobacco use disorder  F17.200 NM Lexiscan stress test            Assessment/Plan:      Sinus tachycardia  Supraventricular tachycardia  At prior visit she attempted to stand for 10 minutes; however, at 5 minutes into standing she started to feel lightheaded, dizzy and needing to sit. She was shaky and diaphoretic. Heart rate prior to sitting after 5 minutes of standing was 132 bpm. Heart rate while sitting prior to standing was 104 bpm. POTs is possibility   Reviewed zio patch showing average heart rate of 106 bpm. Multiple episodes of sinus tachycardia- some may be supraventricular tachycardia.   Discussed options for management- she thinks she stays hydrated with five 20 ounce bottles of water daily, she does not follow any sodium restrictions, she does not use compression to lower extremities but this should be considered  History of diabetes mellitus type II that has been uncontrolled. I think her polydipsia and polyuria could be contributing to tachycardia as she may not be as hydrated as she thinks. Strongly encouraged PCP follow-up for tighter glucose control  Some improvement with starting metoprolol succinate 50 mg once daily.   She has pinpointed symptoms to  taking her klonopin in the morning. If she does  not take it she does not have symptoms. Recommend she consider stopping morning dose and following up with her primary mental health provider to discuss other options on managing her mental health. Discussed today that I do not think we should further increase metoprolol since her symptoms are not present when she avoids klonopin.       Exertional chest discomfort  Dyspnea on exertion  Family history early ischemic heart disease  Risk factors including: poorly controlled type II DM, hyperlipidemia, hypertension, obesity, tobacco use, family history  Plan for NM Lexiscan stress test with symptoms of dyspnea on exertion, exertional chest pain, decreased activity tolerance. She missed appointment for stress testing due to being in the ED with chest pain and vehicle issues.       Lower extremity edema  Transthoracic echocardiogram 4/2023  Global and regional left ventricular function is hyperkinetic with an EF of 65-70%.  Left ventricular diastolic function is normal. Diastolic  Doppler findings (E/E' ratio and/or other parameters) suggest left ventricular filling pressures are normal  Elevation, compression with compression stockings or ACE wraps can be helpful, decrease sodium intake   She did not notice any improvement with furosemide 20 mg once daily so we will stop this.       Hypertension  Continue lisinopril 10 mg once daily. We are stopping furosemide so this may need to be adjusted if blood pressures start to increase.     BP Readings from Last 6 Encounters:   09/26/23 120/84   03/29/23 (!) 160/85         Hyperlipidemia with LDL goal less than 100  Continue atorvastatin 20 mg once daily    Type II Diabetes Mellitus  Uncontrolled with recent A1c of 11+%  Statin: Continue atorvastatin 20 mg once daily  Continue management by PCP  Metformin 1,000 mg twice daily  Insulin  Tried ozempic but had abdominal pain, decreased appetite, nausea        Obesity  Lifestyle  modifications  Could consider medical management with primary care provider  Wt Readings from Last 5 Encounters:   09/26/23 (!) 164.8 kg (363 lb 4.8 oz)   03/29/23 116 kg (255 lb 12.8 oz)         Tobacco use  Smoking cessation strongly encouraged for cardiovascular health as well as overall health      Follow-up after stress testing and seeing mental health provider            Interval history:  Esther Morales is a pleasant 37-year old female who presents for cardiology follow-up.  She has a cardiac history including irregular heart beat, SVT, sinus tachycardia, hypertension, hyperlipidemia. She has a non-cardiac history including type II diabetes mellitus, obesity, tobacco use.       At prior visit she was symptomatic with tachycardia after 5 minutes of standing. We started her on metoprolol succinate and she has not noticed significant improvement. She actually tells me she was having such issues with lightheaded, dizziness that she stopped taking medications for a week except her klonopin. She has noticed that when she takes the klonopin her heart beats fast and she has to lay down. When she takes morning klonopin her activity tolerance is decreased due to sensation of racing in her chest, lightheadedness. She has been taking one as needed which is typically in the morning and in the evening. She has had intermittent episodes of midsternal chest pain. She tells me evaluated in [place], [place] ED and told she had sprained the muscles in her chest. She has not had stress testing completed yet. No concerns for increased LE edema- stable without improvement on furosemide.       HPI:    Esther Morales is a pleasant 37-year old female who presents for cardiology consult regarding abnormal leadless EKG monitor. She has a cardiac history including irregular heart beat, SVT, sinus tachycardia, hypertension, hyperlipidemia. She has a non-cardiac history including type II diabetes mellitus, obesity, tobacco use.       Today, Ms. Morales  "tells me she has a longstanding history of irregular heart beat. She describes this as a pounding sensation and sometimes does make her chest hurt. She tells me that she feels this sensation all day long. When she lies down symptoms improve. She tells me when outside or in the heat she feels near-syncopal. She has had a syncopal episode a few years ago- she tells me she was just standing in a room where it was hot outside and she passed out onto a bed. She was not evaluated as she attributed this to the heat. She also tells me that she feels dizzy and has episodes about twice daily where head starts spinning, vision changes- goes blank. This lasts maybe a minute or two at most. Symptoms usually start after she has been standing for a while. She sits down and rests and symptoms improve.    She is limited to going up or down stairs due to arthritis in her knees. She tells me she could probably walk about 1/2 block and then would need to stop and rest due to chest pain and breathing. Chest discomfort feels like \"a pounding, sometimes a sharp pain.\" Symptoms do improve with rest. She also tells me she has had two week history of bilateral lower extremity edema. Sitting with feet in a dependent position is uncomfortable due to swelling. She does try to keep them elevated but this does not seem to make much difference in swelling or discomfort.       Smoking History: She smokes medical cannabis for anxiety/depression. Started smoking cigarettes at age 17, continues to smoke daily- 1/2 ppd. At her peak she smokes up to 1 ppd.      Alcohol History: None      Substance Abuse History: None      Sleep History: She does snore. She does have a history of witnessed apnea from  who will rouse her awake. Wakes up zero out of 7 mornings feeling rested. She does feel heart pounding at night and it will wake her up. She does have daytime fatigue. She does take midday nap around 12/1pm. She is scheduled to have a sleep " evaluation at [facility] May 15.       Family History: Father- MI s/p stents (first was at age 52), passed away this year at age 65 from MI, she tells me her father also had an ablation on his heart but uncertain exactly what this ablation was for. Mother- 11 open heart surgeries (first MI at age 35 years old), diabetes s/p double amputation, She does have 9 siblings with same father- no cardiac disease  She does have 4 siblings with same mother- oldest brother had MI two years ago at age 37 with CABG.         RELEVANT TESTING:  Transthoracic Echocardiogram 4/26/2023  Interpretation Summary  Left ventricular size is normal. Left ventricular wall thickness cannot  evaluate. Global and regional left ventricular function is hyperkinetic with  an EF of 65-70%. Left ventricular diastolic function is normal. Diastolic  Doppler findings (E/E' ratio and/or other parameters) suggest left ventricular  filling pressures are normal.  Global right ventricular function is normal.  Previous study not available for comparison.    Leadless EKG Monitor 2/2023  Predominant underlying sinus rhythm  Minimum heart rate 46 bpm, maximum  bpm, average heart rate of 106 bpm    x1 run of SVT lasting 5 beats with a max rate of 174 bpm  X 12 triggered events correlated with sinus rhythm HR . Activity not reported with triggered events          No past medical history on file.    No past surgical history on file.    Allergies   Allergen Reactions    Cyclobenzaprine Dizziness     Panic, dizziness       Current Outpatient Medications   Medication Sig Dispense Refill    aspirin 81 MG EC tablet Take 81 mg by mouth daily      atorvastatin (LIPITOR) 20 MG tablet Take 20 mg by mouth At Bedtime      B-D U/F insulin pen needle by Device route as needed (test blokiel suger)      clonazePAM (KLONOPIN) 1 MG tablet Take 1 tablet by mouth 3 times daily      Continuous Blood Gluc  (DEXCOM G6 ) DERRICK USE TO MONITOR BLOOD  SUGAR AS DIRECTED      Continuous Blood Gluc Sensor (DEXCOM G6 SENSOR) MISC USE 1 SENSOR TO MONITOR BLOOD SUGAR REMOVE AND REPLACE SENSOR EVERY 10 DAYS      Continuous Blood Gluc Transmit (DEXCOM G6 TRANSMITTER) MISC USE TO MONITOR BLOOD SUGAR      Contour Next EZ (CONTOUR NEXT EZ W/DEVICE KIT) w/Device KIT 1 drop by to test blood sugar route as needed      LANTUS SOLOSTAR 100 UNIT/ML soln INJECT 20 UNITS UNDER THE SKIN TWICE A DAY EVERY 12 HOURS      lisinopril (ZESTRIL) 10 MG tablet Take 1 tablet (10 mg) by mouth daily at 2 pm 90 tablet 1    loperamide (IMODIUM) 2 MG capsule TAKE ONE CAPSULE BY MOUTH AFTER WATERY STOOL MAY REPEAT FOR 1 DOSE IF DIARRHEA RETURNS      medical cannabis (Patient's own supply) See Admin Instructions (The purpose of this order is to document that the patient reports taking medical cannabis.  This is not a prescription, and is not used to certify that the patient has a qualifying medical condition.)      metFORMIN (GLUCOPHAGE XR) 500 MG 24 hr tablet TAKE 2 TABLET BY MOUTH TWICE A DAY WITH MEALS. SWALLOW TABLET WHOLE; DO NOT CRUSH, DIVIDE OR CHEW.      methocarbamol (ROBAXIN) 500 MG tablet Take 500 mg by mouth At Bedtime      metoprolol succinate ER (TOPROL XL) 50 MG 24 hr tablet Take 1 tablet (50 mg) by mouth every evening 90 tablet 1    Microlet Lancets MISC 1 Application by to test blood sugar route as needed      NOVOLOG FLEXPEN 100 UNIT/ML soln Inject 1 Units Subcutaneous as needed for high blood sugar      OLANZapine (ZYPREXA) 7.5 MG tablet Take 7.5 mg by mouth nightly as needed      pantoprazole (PROTONIX) 40 MG EC tablet Take 1 tablet by mouth daily at 2 pm      semaglutide (OZEMPIC) 2 MG/3ML pen Inject 0.25 mg Subcutaneous every 7 days      VENTOLIN  (90 Base) MCG/ACT inhaler INHALE 1 TO 2 PUFFS EVERY FOUR HOURS AS NEEDED FOR SHORNTESS OF BREATH. SHAKE BEFORE USING.         Social History     Socioeconomic History    Marital status: Single     Spouse name: Not on file     "Number of children: Not on file    Years of education: Not on file    Highest education level: Not on file   Occupational History    Not on file   Tobacco Use    Smoking status: Every Day     Types: Cigarettes    Smokeless tobacco: Never   Substance and Sexual Activity    Alcohol use: Never    Drug use: Yes     Types: Marijuana     Comment: medical    Sexual activity: Not on file   Other Topics Concern    Parent/sibling w/ CABG, MI or angioplasty before 65F 55M? Yes   Social History Narrative    Not on file     Social Determinants of Health     Financial Resource Strain: Not on file   Food Insecurity: Not on file   Transportation Needs: Not on file   Physical Activity: Not on file   Stress: Not on file   Social Connections: Not on file   Interpersonal Safety: Not on file   Housing Stability: Not on file       LAB RESULTS:   Orders placed or performed in visit on 09/26/23    methocarbamol (ROBAXIN) 500 MG tablet    NOVOLOG FLEXPEN 100 UNIT/ML soln    Contour Next EZ (CONTOUR NEXT EZ W/DEVICE KIT) w/Device KIT    B-D U/F insulin pen needle    Microlet Lancets MISC    pantoprazole (PROTONIX) 40 MG EC tablet         Review of systems: Negative except that which was noted in the HPI.    Physical examination:    Vitals: /84 (BP Location: Right arm, Patient Position: Sitting, Cuff Size: Adult Regular)   Pulse 102   Temp 97.1  F (36.2  C) (Tympanic)   Ht 1.702 m (5' 7\")   Wt (!) 164.8 kg (363 lb 4.8 oz)   SpO2 98%   BMI 56.90 kg/m    BMI= Body mass index is 56.9 kg/m .      GENERAL APPEARANCE: alert and no distress  HEENT: no icterus, no xanthelasmas, normal pupil size and reaction, no cyanosis.  NECK: no adenopathy, no asymmetry, masses.  CHEST: lungs clear to auscultation - no rales, rhonchi or wheezes, no use of accessory muscles, no retractions, respirations are unlabored, normal respiratory rate  CARDIOVASCULAR: regular rhythm, normal S1 with physiologic split S2, no S3 or S4 and no murmur, click or " rub  EXTREMITIES: +trace to +1 bilateral LE edema  NEURO: alert and oriented normal speech, and affect  VASC: No vascular bruits heard.  SKIN: no ecchymoses, no rashes        Thank you for allowing me to participate in the care of your patient. Please do not hesitate to contact me if you have any questions.       Theodora Bird CNP      Total time spent on day of visit, including review of tests, obtaining/reviewing separately obtained history, ordering medications/tests/procedures, communicating with PCP/consultants, and documenting in electronic medical record: 40 minutes.

## 2023-10-27 ENCOUNTER — TELEPHONE (OUTPATIENT)
Dept: NUCLEAR MEDICINE | Facility: HOSPITAL | Age: 38
End: 2023-10-27

## 2023-11-27 ENCOUNTER — TELEPHONE (OUTPATIENT)
Dept: NUCLEAR MEDICINE | Facility: HOSPITAL | Age: 38
End: 2023-11-27

## 2023-11-27 NOTE — TELEPHONE ENCOUNTER
Appointment Reminder Call    Patient cancelled during the reminder called and stated she would call in when she is ready to reschedule.  I will inform DI scheduling team.    Per patient she is having vehicle issues.

## 2024-01-26 ENCOUNTER — TELEPHONE (OUTPATIENT)
Dept: NUCLEAR MEDICINE | Facility: HOSPITAL | Age: 39
End: 2024-01-26

## 2024-01-29 ENCOUNTER — TELEPHONE (OUTPATIENT)
Dept: NUCLEAR MEDICINE | Facility: HOSPITAL | Age: 39
End: 2024-01-29

## 2024-01-29 NOTE — TELEPHONE ENCOUNTER
Made an appointment reminder call on 1/26/24 a 1100 regarding stress test scheduled on 1/29/24at 700. Went over prep for the test with the patient.

## 2024-01-29 NOTE — TELEPHONE ENCOUNTER
Patient was scheduled for a Nuclear Medicine lexiscan stress test 1/29/24 at 700.  She received a reminder call on 1/26/24 and the technologist talked to her and discussed the prep for the test. She was a no show today.  We can try 1 more time to get this done, she has no showed 5 times since last June in an attempt to get this done. Each time an appointment slot and 2 doses are wasted. Maybe a care coordinator could reach out to see if there are barriers for the patient . If she is a no show  again the policy is to cancel the order.     Please reach out to us if there are any questions.     Nuclear medicine [phone]

## 2024-01-30 NOTE — TELEPHONE ENCOUNTER
Attempted to reach patient to discuss missed appts for stress testing.   No answer, left voicemail with the number for cardiology requesting a return call.

## 2025-02-24 NOTE — PATIENT INSTRUCTIONS
Thank you for allowing Theodora Bird CNP and our  team to participate in your care. Please call our office at [phone] with scheduling questions or if you need to cancel or change your appointment. With any other questions or concerns you may call cardiology nurse at [phone] or [phone].       If you experience chest pain, chest pressure, chest tightness, shortness of breath, fainting, lightheadedness, nausea, vomiting, or other concerning symptoms, please report to the Emergency Department or call 911. These symptoms may be emergent, and best treated in the Emergency Department.      Sinus tachycardia  Supraventricular tachycardia  We attempted to stand for 10 minutes; however, at 5 minutes into standing she started to feel lightheaded, dizzy and needing to sit. She was shaky and diaphoretic. Heart rate prior to sitting after 5 minutes of standing was 132 bpm. Heart rate while sitting prior to standing was 104 bpm. POTs is possibility   Reviewed zio patch showing average heart rate of 106 bpm. Multiple episodes of sinus tachycardia- some may be supraventricular tachycardia.   Discussed options for management- she thinks she stays hydrated with five 20 ounce bottles of water daily, she does not follow any sodium restrictions, she does not use compression to lower extremities but this should be considered  History of diabetes mellitus type II as well as hypertension. We will trial beta-blocker to see if there is some improvement in symptoms. Start metoprolol succinate 50 mg once daily.       Exertional chest discomfort  Dyspnea on exertion  Family history early ischemic heart disease  Risk factors including: poorly controlled type II DM, hyperlipidemia, hypertension, obesity, tobacco use, family history  Plan for NM Lexiscan stress test with symptoms of dyspnea on exertion, exertional chest pain, decreased activity tolerance      Lower extremity edema  Plan for transthoracic  echocardiogram  Elevation, compression with compression stockings or ACE wraps can be helpful, monitor sodium intake  Start furosemide 20 mg once daily      Hypertension  Increase lisinopril 5 mg once daily to lisinopril 10 mg once daily  We are starting furosemide 20 mg once daily for LE edema which should also improve blood pressure  BP Readings from Last 6 Encounters:   03/29/23 (!) 160/85       Hyperlipidemia with LDL goal less than 100  Continue atorvastatin 20 mg once daily      Type II Diabetes Mellitus  Uncontrolled with recent A1c of 11+%  Statin: Continue atorvastatin 20 mg once daily  Continue management by PCP  Metformin 1,000 mg twice daily  Insulin      Obesity  Lifestyle modifications  Could consider medical management with primary care provider      Tobacco use  Smoking cessation strongly encouraged for cardiovascular health as well as overall health      Follow-up with cardiology after completion of testing, certainly sooner with acute concerns.      Theodora Bird, CNP    show